# Patient Record
Sex: FEMALE | Race: WHITE
[De-identification: names, ages, dates, MRNs, and addresses within clinical notes are randomized per-mention and may not be internally consistent; named-entity substitution may affect disease eponyms.]

---

## 2018-02-16 ENCOUNTER — HOSPITAL ENCOUNTER (OUTPATIENT)
Dept: HOSPITAL 44 - RHEU | Age: 73
End: 2018-02-16
Attending: INTERNAL MEDICINE
Payer: MEDICARE

## 2018-02-16 DIAGNOSIS — M17.0: ICD-10-CM

## 2018-02-16 DIAGNOSIS — M13.0: Primary | ICD-10-CM

## 2018-02-16 DIAGNOSIS — I73.00: ICD-10-CM

## 2018-02-16 DIAGNOSIS — M41.20: ICD-10-CM

## 2018-02-16 PROCEDURE — 36415 COLL VENOUS BLD VENIPUNCTURE: CPT

## 2018-02-16 PROCEDURE — 99215 OFFICE O/P EST HI 40 MIN: CPT

## 2018-02-16 NOTE — HISTORY AND PHYSICAL REPORT
History of Present Illnes





- History of Present Illness


Reason for Visit: Joint pain


History of Present Illness: 





72 year old white woman with iver 15 years of joint pain and color changes of 

the hands and feet.  She is concerned for her thumbs are getting worse.  Hard 

to , deformed and painful.  She also has bilateral foot pain with morning 

stiffness lasting over 15 minutes.  She has OA of the knees and tells me she 

suffers from Fibromyalgia.  Foot Xrays have been done





- Past Medical History


Pulmonary: Pneumonia


Gastrointestinal: Diverticulosis


Renal/: Chronic renal insuff





- Past Surgical History


Past Surgical History: Hysterectomy, Tonsillectomy





- Past Social History


Smoke: No


Alcohol: Rare


Drugs: None


Lives: With Family





- Health Maintenance


Health Maintenance: Influenza Vaccine, Mammogram, DEXA


Influenza Vaccine: Current for this Influenza Season





Review of Systems





- Review of Systems


Constitutional: negative: Fever, Chills, Sweats, Weakness, Malaise, Deferred, 

Other


Eyes: negative: pain, vision change, conjunctivae inflammation, eyelid 

inflammation, redness, Deferred, other


ENT: negative: Ear Pain, Ear Discharge, Nose Pain, Nose Discharge, Nose 

Congestion, Mouth Pain, Mouth Swelling, Throat Pain, Throat Swelling, Deferred, 

Other


Respiratory: negative: Cough, Dry, Shortness of Breath, Hemoptysis, SOB with 

Excertion, Pleuritic Pain, Sputum, Wheezing, Deferred, Other


Cardiovascular: negative: Chest Pain, Palpitations, Orthopnea, Paroxysmal Noc. 

Dyspnea, Edema, Light Headedness, Deferred, Other


Gastrointestinal: negative: Nausea, Vomiting, Abdominal Pain, Diarrhea, 

Constipation, Melena, Hematochezia, Deferred, Other


Genitourinary: negative: Dysuria, Frequency, Incontinence, Hematuria, Retention

, Deferred, Other


Musculoskeletal: Hand Pain, Leg Pain, Foot Pain.  negative: Neck Pain, Shoulder 

Pain, Arm Pain, Back Pain, Deferred, Other


Skin: Rash


Neurological: negative: Weakness, Numbness, Incoordination, Change in Speech, 

Confusion, Seizures, Deferred, Other





- Medications/Allergies


Allergies/Adverse Reactions: 


 Allergies











Allergy/AdvReac Type Severity Reaction Status Date / Time


 


levofloxacin [From Levaquin] Allergy Intermediate  Verified 02/16/18 14:20


 


fluconazole Allergy Mild  Verified 02/16/18 14:20

















Exam





- Exam


General: Alert, Oriented to Person, Oriented to Place, Oriented to Time, 

Cooperative, No acute distress


HEENT: Atraumatic, PERRLA, EOMI, Hearing Grossly Normal


Lungs: Clear to auscultation, Normal air movement, Speaks full Sentences


Cardiovascular: Regular rate, Normal S1, Normal S2, No murmurs


Abdomen: Normal bowel sounds, Soft, No tenderness, No hepatospenomegaly, No 

masses


Integumentary: Normal, Pink, Warm, Dry


Extremities: No clubbing, No cyanosis, No edema, Normal pulses, No tenderness/

swelling, Other (Joint exam revels CMC pain with muscle atrophy and Z 

deformity.  Knees cahnges of OA, ankles tender and MTPs tender.  Mild T spine 

scoliosis.)


Neurological: Normal gait, Normal speech, Strength Equal Bilat, Normal tone





Assessment/Plan





- Assessment/Plan


(1) Polyarthritis


Status: Acute   Current Visit: Yes   


Plan: 


Check [facility] for CTD and xrays of feet.  Obtain and review previus hands films.








(2) Raynaud's disease without gangrene


Status: Acute   Current Visit: Yes   





(3) Osteoarthritis of knees, bilateral


Status: Acute   Current Visit: Yes   





(4) Scoliosis (and kyphoscoliosis), idiopathic


Status: Acute   Current Visit: Yes

## 2018-03-16 ENCOUNTER — HOSPITAL ENCOUNTER (OUTPATIENT)
Dept: HOSPITAL 44 - RHEU | Age: 73
End: 2018-03-16
Attending: INTERNAL MEDICINE
Payer: MEDICARE

## 2018-03-16 DIAGNOSIS — M05.9: ICD-10-CM

## 2018-03-16 DIAGNOSIS — M19.90: ICD-10-CM

## 2018-03-16 DIAGNOSIS — I73.00: Primary | ICD-10-CM

## 2018-03-16 PROCEDURE — 99214 OFFICE O/P EST MOD 30 MIN: CPT

## 2018-03-22 NOTE — OP CLINIC PROGRESS NOTE
REASON FOR VISIT: 

Molly Antonio returns for follow up of joint pain and Raynaud's.   She is doing 
a little bit better than when I last saw her.  Most of her problem is her knees
, which should apparently be replaced bilaterally.  She continues to have some 
color changes in her hands and feet in the cold.  She has some bilateral foot 
pain mainly superior and morning stiffness between 15 and 30 minutes.  



PAST MEDICAL HISTORY:

1.   Pneumonia. 

2.   Diverticulitis.

3.   Chronic renal insufficiency.



PAST SURGICAL HISTORY:

1.   Hysterectomy.

2.   Tonsillectomy.



SOCIAL HISTORY:

No smoking.  No alcohol.  



ALLERGIES:

Levaquin.

Fluconazole. 



REVIEW OF SYSTEMS: 

No fevers, chills, sweats, chest pain, shortness of breath, cough, wheezing, 
nausea, vomiting, or diarrhea.  No difficulty swallowing.  No dyspnea on 
exertion. 

   

PHYSICAL EXAMINATION: 

GENERAL:  She looks well. 

VITAL SIGNS:  Height:   5 feet 3 inches.  Weight:  189.  T:  96.9, R: 20, heart 
rate 81, BP: 115/97. 

HEENT:  No facial or oral lesions. 

LUNGS:  Clear with no crackles or wheezing. 

HEART:  Regular rate and rhythm. 

ABDOMEN:  Soft and nontender. 

VASCULAR:  No edema or cyanosis. 

PERIPHERAL JOINTS:  Tenderness at the DIPs, otherwise, MCPs, wrists, elbows, 
shoulder, hips, knees, ankles, and feet are unremarkable.  She has some hard 
bony deformities at the dorsal aspect of her feet but no synovitis.  



DIAGNOSTIC STUDIES:

She brings in x-rays of her right hand, as well as her feet.  I personally 
reviewed the films, as she has some changes of osteoarthritis and diffuse 
osteopenia, otherwise, no soft tissue swelling.  Good bony alignment and no 
erosions at the MCPs and PIPs.  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 
bones were unremarkable. 



X-rays of her feet, again, show changes of osteoarthritis and specifically no 
erosions at the MTPs.  



[facility] testing came back positive with her TAYLOR in centromere pattern, as well as 
a positive anti-CENP IgG.  Interestingly enough, she has a low titer rheumatoid 
factor of 6.7 IgM class.  CCP-antibody was negative.  Cardiolipin's and thyroid 
antibodies were negative and the rest of the TAYLOR profile was negative. 



IMPRESSION: 

1.   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 
Raynaud's at this time. 

2.   Rheumatoid factor of unknown significant with no clinical evidence of 
rheumatoid disease. 



PLAN: 

1.   For her joint discomfort and stiffness, we are going to try Plaquenil 200 
mg 1 tablet twice a day. 

2.   I would like to see her back in 3 months. 



Thank you very much.



Best regards.
MTDD

## 2018-07-20 ENCOUNTER — HOSPITAL ENCOUNTER (OUTPATIENT)
Dept: HOSPITAL 44 - RHEU | Age: 73
End: 2018-07-20
Attending: INTERNAL MEDICINE
Payer: MEDICARE

## 2018-07-20 DIAGNOSIS — Z79.899: ICD-10-CM

## 2018-07-20 DIAGNOSIS — G56.02: ICD-10-CM

## 2018-07-20 DIAGNOSIS — I73.00: ICD-10-CM

## 2018-07-20 DIAGNOSIS — M34.1: Primary | ICD-10-CM

## 2018-07-20 PROCEDURE — 99214 OFFICE O/P EST MOD 30 MIN: CPT

## 2018-07-20 NOTE — OP CLINIC PROGRESS NOTE
REASON FOR VISIT: 

Molly Antonio returns for follow up of joint pain and Raynaud's phenomenon.   
Serologically, she has a positive centromere antibody and a positive rheumatoid 
factor.  I put her on Plaquenil in March and she did not tolerate it.   It made 
her dizzy and weak.  



Today, she complains of pain and stiffness in her hands and feet and she tells 
me she is scheduled for a carpal tunnel release on August 24 on the left.   Her 
knees still cause her some problems with grinding and creaking.  Pain is about 
4 over 10.  Morning stiffness is over 30 minutes.  



PAST MEDICAL HISTORY:

1.   Pneumonia. 

2.   Diverticulitis. 

3.   Chronic renal insufficiency. 

4.   Hysterectomy. 

5.   Tonsillectomy.



SOCIAL HISTORY:

No smoking.  No drinking. 



ALLERGIES:

1.   Levaquin.

2.   Fluconazole. 

3.   Intolerance to Plaquenil.  



REVIEW OF SYSTEMS: 

No fevers, chills, sweats, chest pain, shortness of breath, cough, wheezing, 
nausea, vomiting, or diarrhea.  



PHYSICAL EXAMINATION: 

GENERAL:   She looks well. 

VITAL SIGNS:  Height:  5 feet 3 inches.   Weight:  189.  T:  98.2, R: 20, heart 
rate 64, BP:  150/98.

HEENT:  Sclerae are anicteric.  Conjunctivae are pink.  No stomatitis or 
glossitis.   

LUNGS:  Clear bilaterally.  No crackles or wheezing. 

HEART:  Regular rate and rhythm.    

ABDOMEN:  Soft and nontender. 

VASCULAR:  No edema or cyanosis.

PERIPHERAL JOINTS:  She has a little tenderness at the MCPs and wrists but no 
overt synovitis.   She has a positive Tinel's on the left.  Ankles and feet are 
slightly tender. 



PREVIOUS STUDIES DONE:

1.    X-rays of her right hand and feet show changes of osteoarthritis.  

2.    [facility] testing revealed a positive TAYLOR, centromere pattern, positive anti-
CENP antibody and a rheumatoid factor of 6.7.  IgM CCP antibody was negative.  



IMPRESSION:

1.   CREST syndrome with Raynaud's, stable. 

2.   Carpal tunnel syndrome on the left, may be secondary to rheumatoid 
arthritis. 

3.   Intolerance to Plaquenil.



PLAN: 

1.   I think this lady is developing rheumatoid arthritis.  

2.   I am putting her on prednisone 5 mg twice a day for the next 8 weeks.

3.   She will be taking a trip to [place] in the meantime. 

4.   We will contemplate the use of low-dose methotrexate.

5.   Depending on her renal function, which will need to be updated at her next 
visit.



Thank you very much.





cc:   Dr. Doc BLACK

## 2018-09-21 ENCOUNTER — HOSPITAL ENCOUNTER (OUTPATIENT)
Dept: HOSPITAL 44 - RHEU | Age: 73
End: 2018-09-21
Attending: INTERNAL MEDICINE
Payer: MEDICARE

## 2018-09-21 DIAGNOSIS — M05.9: Primary | ICD-10-CM

## 2018-09-21 PROCEDURE — 99214 OFFICE O/P EST MOD 30 MIN: CPT

## 2018-09-24 NOTE — OP CLINIC PROGRESS NOTE
REASON FOR VISIT: 

Molly Antonio returns for follow up of CREST syndrome, Raynaud's, and 
seropositive rheumatoid arthritis.  She tried and failed Plaquenil in the past. 
On her last visit, I put her on prednisone 5 mg twice a day and she feels great.
 All the pain and stiffness in her hands have resolved.  Her carpal tunnel 
symptoms have resolved and has not required surgery.  Her knees are no longer 
painful.  Pain is zero over 10 and morning stiffness is less than 15 minutes. 



PAST MEDICAL HISTORY:

1.   Pneumonia. 

2.   Diverticulitis. 

3.   Chronic renal insufficiency. 

4.   Hysterectomy.

5.   Tonsillectomy.



SOCIAL HISTORY:

No smoking.  No drinking.  



PRESENT MEDICATIONS: 

1.   Prednisone 5 mg twice a day. 

2.   Celebrex. 

3.   Omeprazole. 



She has an issue with hypertension.  She tried losartan but failed to tolerate 
it.  



ALLERGIES:

1.   Levaquin. 

2.   Fluconazole. 

3.   Intolerance to Plaquenil.



REVIEW OF SYSTEMS: 

She is doing well with no fevers, chills, sweats, chest pain, shortness of 
breath, cough, wheezing, nausea, vomiting, or diarrhea.   No skin rashes or 
nodules. 



PHYSICAL EXAMINATION: 

VITAL SIGNS:  Height:  5 feet 3 inches.  Weight:  191 pounds.  T: 97.6, R: 20, 
heart rate 72, BP:  150/80.  

HEENT:   Sclerae are anicteric.  Conjunctivae are pink.  No stomatitis or 
glossitis.   

LUNGS:  Clear bilaterally with no crackles or wheezing. 

HEART:  Regular rate and rhythm.    

ABDOMEN:  Soft and nontender. 

VASCULAR:  No edema or cyanosis.

PERIPHERAL JOINTS:  DIPs, PIPs, MCPs, wrists, elbows, shoulders, hips, knees, 
ankles, and feet show no synovitis.  Tinel's testing is negative. 



IMPRESSION:

Seropositive rheumatoid arthritis, improved. 



PLAN:

1.   I am going to try to decrease her prednisone to 5 mg daily. 

2.   I need to update a CBC, CMP, and a sedimentation rate. 

3.   If she fails prednisone 5 mg daily, we will contemplate methotrexate if her
renal function allows.  

4.   I will see her back in November. 



Thank you very much.



Best regards,





cc:   Dr. Pierre BLACK

## 2018-11-16 ENCOUNTER — HOSPITAL ENCOUNTER (OUTPATIENT)
Dept: HOSPITAL 44 - RHEU | Age: 73
Discharge: HOME | End: 2018-11-16
Attending: INTERNAL MEDICINE
Payer: MEDICARE

## 2018-11-16 DIAGNOSIS — M34.1: ICD-10-CM

## 2018-11-16 DIAGNOSIS — I73.00: ICD-10-CM

## 2018-11-16 DIAGNOSIS — M05.89: Primary | ICD-10-CM

## 2018-11-16 PROCEDURE — 99213 OFFICE O/P EST LOW 20 MIN: CPT

## 2018-11-19 NOTE — OP CLINIC PROGRESS NOTE
REASON FOR VISIT: 

Molly Antonio returns for follow up of CREST syndrome, Raynaud's, and 
seropositive rheumatoid arthritis.  She has tried and failed Plaquenil in the 
past.  On her last visit, she was doing well.  We tried to decrease her 
prednisone from 5 mg twice a day to 5 mg daily.  However, she did not tolerate 
that and within in 2 weeks, she had to bump it back up.  Presently, she is not 
having any joint swelling, pain, stiffness, numbness or tingling of her 
extremities.  Her carpal tunnel symptoms have resolved.  She has had no skin 
rashes.  No nodules.  No chest pain, shortness of breath, cough or wheezing.  



PAST MEDICAL HISTORY: 

1.   Pneumonia. 

2.   Diverticulitis. 

3.   Chronic renal insufficiency. 

4.   Hysterectomy.

5.   Tonsillectomy.



PRESENT MEDICATIONS: 

1.   Prednisone 5 mg twice a day.

2.   Omeprazole.



ALLERGIES:

1.   Levaquin.

2.   Fluconazole.

3.   Intolerance to Plaquenil.



SOCIAL HISTORY:

No smoking.  No drinking.  She sold her house.  She now lives in a townhouse. 



REVIEW OF SYSTEMS: 

As above. 



PHYSICAL EXAMINATION: 

GENERAL:  She looks well. 

VITAL SIGNS:   Weight:  109 pounds.  BP:  154/76, T: 97.9, P: 80, R: 18. 

HEENT:    Sclerae are anicteric.  Conjunctivae are pink.  No stomatitis or 
glossitis.   

LUNGS:  Clear bilaterally  with no crackles or wheezing. 

HEART:  Regular rate and rhythm.    

ABDOMEN:  Soft and nontender. 

VASCULAR:  Shows no edema or cyanosis.

PERIPHERAL JOINTS:  DIPs and PIPs show changes of osteoarthritis.  She has no 
MCP tenderness or synovitis.  Good range of motion at the wrists with no 
tenderness and good .   Elbows, shoulders, hips, knees, ankles, and feet are
unremarkable. 



LABORATORY:

She had some recent blood work.  She did not bring it with her but she recalls 
her creatinine being normal and her BUN was 22.  Her hemoglobin was around 13.  
She promises to mail me those labs. 



IMPRESSION:

Seropositive rheumatoid arthritis.



PLAN: 

1.   We are going to institute methotrexate 3 tablets weekly with folic acid 
supplementation.

2.   I will see her back in 4 weeks. 





cc:    Dr. Pierre BLACK

## 2018-12-21 ENCOUNTER — HOSPITAL ENCOUNTER (OUTPATIENT)
Dept: HOSPITAL 44 - RHEU | Age: 73
End: 2018-12-21
Attending: INTERNAL MEDICINE
Payer: MEDICARE

## 2018-12-21 DIAGNOSIS — M05.80: Primary | ICD-10-CM

## 2018-12-21 PROCEDURE — 99212 OFFICE O/P EST SF 10 MIN: CPT

## 2019-01-02 NOTE — OP CLINIC PROGRESS NOTE
REASON FOR VISIT: 

Molly Antonio returns for follow up of seropositive rheumatoid arthritis and 
CREST Syndrome.   She thinks methotrexate is helping and she has tolerated it 
well.  She still has 4 over 10 pain and morning stiffness of 30 minutes which 
she feels has improved.  She, however, continues on prednisone 5 mg twice a day.



REVIEW OF SYSTEMS: 

No fevers, chills, sweats, chest pain, shortness of breath, cough, wheezing.   



PHYSICAL EXAMINATION: 

VITAL SIGNS:  Pain is 4 over 10.  BP:  148/80, P: 80, R: 20, T: 97.9.

HEENT:   Sclerae are anicteric.  Conjunctivae are pink.  No stomatitis or 
glossitis.   

LUNGS:  Clear.

HEART:  Regular rate and rhythm.    

ABDOMEN:  Soft. 

PERIPHERAL JOINTS:  Show changes of osteoarthritis.  A little tenderness at the 
wrists and fullness at the MCPs.   Feet, ankles, and knees are unremarkable. 



IMPRESSION:

Seropositive rheumatoid arthritis, active. 



PLAN: 

1.   I am bumping up her methotrexate to 6 a week. 

2.   Continue prednisone and Folic acid. 

3.   I will see her back in a month. 



Thank you very much.





cc:   Dr. Doc BLACK